# Patient Record
Sex: FEMALE | URBAN - METROPOLITAN AREA
[De-identification: names, ages, dates, MRNs, and addresses within clinical notes are randomized per-mention and may not be internally consistent; named-entity substitution may affect disease eponyms.]

---

## 2024-05-13 ENCOUNTER — TELEPHONE (OUTPATIENT)
Dept: OTHER | Facility: OTHER | Age: 64
End: 2024-05-13

## 2024-05-13 NOTE — TELEPHONE ENCOUNTER
Progress Note:  Left message for patient to return call for assistance with getting access to medical care or making any needed appointments.    Mammogram Screening ([facility]):  Pap smear screening ([facility] vs [facility] vs [facility]):  PCP ([facility] vs [facility]):     Transportation:     Education: